# Patient Record
Sex: MALE | Race: WHITE | NOT HISPANIC OR LATINO | Employment: FULL TIME | URBAN - METROPOLITAN AREA
[De-identification: names, ages, dates, MRNs, and addresses within clinical notes are randomized per-mention and may not be internally consistent; named-entity substitution may affect disease eponyms.]

---

## 2023-09-01 ENCOUNTER — TRANSFERRED RECORDS (OUTPATIENT)
Dept: HEALTH INFORMATION MANAGEMENT | Facility: CLINIC | Age: 58
End: 2023-09-01

## 2023-12-08 ENCOUNTER — TRANSFERRED RECORDS (OUTPATIENT)
Dept: HEALTH INFORMATION MANAGEMENT | Facility: CLINIC | Age: 58
End: 2023-12-08

## 2024-01-12 ENCOUNTER — MEDICAL CORRESPONDENCE (OUTPATIENT)
Dept: HEALTH INFORMATION MANAGEMENT | Facility: CLINIC | Age: 59
End: 2024-01-12
Payer: COMMERCIAL

## 2024-01-15 ENCOUNTER — TRANSCRIBE ORDERS (OUTPATIENT)
Dept: OTHER | Age: 59
End: 2024-01-15

## 2024-01-15 DIAGNOSIS — M48.02 SPINAL STENOSIS IN CERVICAL REGION: Primary | ICD-10-CM

## 2024-01-16 ENCOUNTER — TELEPHONE (OUTPATIENT)
Dept: ORTHOPEDICS | Facility: CLINIC | Age: 59
End: 2024-01-16
Payer: COMMERCIAL

## 2024-01-16 NOTE — TELEPHONE ENCOUNTER
Sriram calling in today to see if a spine doc from the  will review his spine referral from Dr. Zaragoza at [facility] and recommend a provider for him. Per pt, he has a pretty complicated case and wants to be sure he's making his consult with an appropriate provider who can work on his spine. Informed pt I would submit a TE and have one of our care team members call him back with their recommendation. You can call pt back at cell number listed or call his wife-Gabbi at [phone]

## 2024-01-16 NOTE — TELEPHONE ENCOUNTER
Left Voice mail that Pt should call back and schedule with any of the cervical spine Surgeons. Needs an MRI within the last 6 months per protocol     Mariaa

## 2024-01-24 DIAGNOSIS — M54.2 CERVICAL SPINE PAIN: Primary | ICD-10-CM

## 2024-01-24 DIAGNOSIS — M48.02 CERVICAL STENOSIS OF SPINAL CANAL: Primary | ICD-10-CM

## 2024-01-29 ENCOUNTER — TELEPHONE (OUTPATIENT)
Dept: ORTHOPEDICS | Facility: CLINIC | Age: 59
End: 2024-01-29

## 2024-01-29 NOTE — TELEPHONE ENCOUNTER
[facility] Call Center    Phone Message    May a detailed message be left on voicemail: yes     Reason for Call: Other: Patient is calling to confirm that Dr. Atkinson and is team have received all the records from [facility], MRI's, xrays, and Dr. Villa notes.  Please call patient to confirm.      Action Taken: Message routed to:  [facility] ([facility] Ortho    Travel Screening: Not Applicable

## 2024-01-31 NOTE — TELEPHONE ENCOUNTER
Action January 31, 2024 9:55 AM MT   Action Taken Sent a request for records and imaging from [facility].      Action January 31, 2024 3:18 PM MT   Action Taken Sent a request for radiology reports from  Radiology and [facility].     DIAGNOSIS: 2nd Opinion  Spinal stenosis in cervical region [M48.02]  - Primary   APPOINTMENT DATE: 02/01/2024   NOTES STATUS DETAILS   OFFICE NOTE from referring provider Media Tab Dr. Humza Zaragoza  - [facility]   MEDICATION LIST Care Everywhere    LABS     MRI PACS  [facility]:  12/08/2023 - C Spine    [facility]:  08/30/2023 - Shoulder   XRAYS (IMAGES & REPORTS) PACS [facility]:  01/12/2024 - C Spine  10/13/2023, 08/06/2023 - RT Shoulder

## 2024-02-01 ENCOUNTER — OFFICE VISIT (OUTPATIENT)
Dept: ORTHOPEDICS | Facility: CLINIC | Age: 59
End: 2024-02-01
Payer: COMMERCIAL

## 2024-02-01 ENCOUNTER — PRE VISIT (OUTPATIENT)
Dept: ORTHOPEDICS | Facility: CLINIC | Age: 59
End: 2024-02-01

## 2024-02-01 VITALS — WEIGHT: 180 LBS | HEIGHT: 70 IN | BODY MASS INDEX: 25.77 KG/M2

## 2024-02-01 DIAGNOSIS — M48.02 SPINAL STENOSIS IN CERVICAL REGION: Primary | ICD-10-CM

## 2024-02-01 DIAGNOSIS — Q76.49 CONGENITAL CERVICAL SPINE STENOSIS: ICD-10-CM

## 2024-02-01 PROCEDURE — 99204 OFFICE O/P NEW MOD 45 MIN: CPT | Performed by: ORTHOPAEDIC SURGERY

## 2024-02-01 RX ORDER — ROSUVASTATIN CALCIUM 10 MG/1
10 TABLET, COATED ORAL DAILY
COMMUNITY
Start: 2023-11-14

## 2024-02-01 RX ORDER — ASPIRIN 81 MG/1
81 TABLET ORAL DAILY
COMMUNITY
Start: 2023-11-14

## 2024-02-01 NOTE — LETTER
2/1/2024         RE: Marvin Maza  [address] [place] [address]        Dear Colleague,    Thank you for referring your patient, Marvin Maza, to the [facility] [place]. Please see a copy of my visit note below.    In-Person Visit    REASON FOR CONSULTATION: RECHECK (NEW CERVICAL SPINE )     REFERRING PHYSICIAN: No ref. provider found   PCP:No Ref-Primary, Physician    Presents for 2nd opinion; previously saw Dr. Humza NEVAREZ    History of Present Illness: Patient presents to clinic today for 2nd opinion re cervical spine findings on MRI; accompanied by wife.      Patient suffered from a minor right shoulder fracture in August 2023.  While he was recovering from this, he started having increased pain and symptoms into the left arm radiating down into the biceps/triceps, all the way down into the forearm.  Due to these new symptoms, he had a cervical MRI done which showed severe spinal canal stenosis, which led to referral to see a spine surgeon.    He saw Dr. Humza Zaragoza ([facility]) who recommended surgery for.  However, his left arm symptoms have since improved. He does deal with chronic numbness in digits 1 through 3 on the left side, but this is not extremely bothersome to him.  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  He denies any new or worsening balance difficulties.  Denies new or worsening weakness in the upper or lower extremities.  He is here today to review imaging and discuss treatment plans.    Neck 0%, Arm 0%,      Previous treament:   Engaged in physical therapy for his right shoulder fracture    Social history  Work: Patient is a dentist  Tobacco use: Denies  Recreation: Patient is very active at baseline.  He enjoys golfing.  He utilizes treadmill and stationary bike for cardiac exercises.  Previously was doing a lot of lifting.      Oswestry (LEYLA) Questionnaire         "1/25/2024     2:07 PM   OSWESTRY DISABILITY INDEX   Count 10   Sum 1   Oswestry Score (%) 2 %        Neck Disability Index (NDI) Questionnaire        1/25/2024     2:20 PM   Neck Disability Index (NDI)   Neck Disability Index: Count 10   NDI: Total Score = SUM (points for all 10 findings) 1   Neck Disability in Percent = (Total Score) / 50 * 100 2 (%)        PROMIS-10 Scores  Global Mental Health Score: (P) 19  Global Physical Health Score: (P) 18  PROMIS TOTAL - SUBSCORES: (P) 37    ROS: A 12-point review of systems was completed and is negative except for otherwise noted above in the history of present illness.    Med Hx:  Past Medical History:   Diagnosis Date    NO ACTIVE PROBLEMS        Surg Hx:  Past Surgical History:   Procedure Laterality Date    DENTAL SURGERY PROCEDURE      HERNIA REPAIR, INGUINAL RT/LT      LT, 10 weeks old.     INCISION OF PYLORIC MUSCLE      12 weeks old.        Allergies:  No Known Allergies    Meds:  Current Outpatient Medications   Medication    aspirin 81 MG EC tablet    rosuvastatin (CRESTOR) 10 MG tablet    MEDROL (REAGAN) 4 MG OR TABS    MULTI-VITAMIN OR TABS    SYNALAR 0.01 % EX SOLN     No current facility-administered medications for this visit.       Fam Hx:  Family History   Problem Relation Age of Onset    C.A.D. Father         69 yo       P/S Hx:  Social History     Tobacco Use    Smoking status: Never    Smokeless tobacco: Not on file   Substance Use Topics    Alcohol use: Yes     Comment: occasional         Physical Exam:  Very pleasant, healthy appearing, alert, oriented x 3, cooperative.  Normal mood and affect.  Not in cardiorespiratory distress.  Ht 1.784 m (5' 10.25\")   Wt 81.6 kg (180 lb)   BMI 25.64 kg/m    Normal upright posture.    Normal gait without assistive device.  No antalgia / imbalance.  Able to perform tandem gait with ease.  Negative Romberg's.    Neck: normal neck posture, able to maintain horizontal gaze.  No deformity, no skin lesions or surgical " scars.  No neck tenderness.  ROM:   Full painless flexion, extension, R and L rotation.  (-) Spurling's bilat.  (-) Lhermitte's.     Neuro Exam:  Motor: 5/5 strength for all muscle groups in both UE; except 4+/5 left wrist extension  Sensory:  Intact to light touch in both UE's.   Reflexes:      Biceps 2+ bilat.      Brachioradialis 2+ bilat.      Triceps 2+ bilat.    (+) Smith right, (-) left.    Upper extremity:  Full pulses, pink nailbeds, good capillary / refill.  (-) atrophy / asymmetry.        Imagin2024 XR cervical spine AP/lateral flexion/extension views: Multilevel spondylosis.  No evidence of fracture or spondylolisthesis.  No evidence of instability on flexion/extension views.    2023 MRI cervical spine without contrast: Congenitally narrow spinal canal measuring about 8 mm anterior-posterior distance.  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    Plan:   Reviewed patient's own MR and XR imaging with him today.  He does have age-appropriate degenerative changes to his neck.  He appears to have congenital narrowing of his spinal canal.  Therefore, there are multiple areas of spinal canal stenosis noted on MRI.  Fortunately, patient is currently asymptomatic, no evidence of myelopathy, and his previous cervical radicular symptoms have resolved.  Discussed risks and benefits of proceeding with surgery versus continued with observational management.  At this point, we would recommend continued observation.  Discussed the symptoms of cervical myelopathy in detail, and patient can reach out to us if these begin to develop.  At that time, we can discuss surgical intervention in further detail.  If myelopathic symptoms do develop, then likely would recommend laminoplasty procedure.  Patient and his wife asked many  intelligent questions which were answered to their understanding.    - Okay to continue with activities as tolerated including biking, running, lifting, golfing.  Avoid high impact activities.     RTC in 1 year for recheck, with repeat XR cervical AP/lateral views. Or sooner if he develops myelopathic/ radicular symptoms    Attestation:  I (Dr. Joe Atkinson - Spine Surgeon) have personally evaluated patient with PGY-1 Legister, and agree with findings and plan outlined in the note, which I also edited.  I discussed at length with the patient/family, explained the nature of spinal condition, and formulated workup and/or treatment plan together.  All questions were answered to the best of my ability and to patient's apparent satisfaction.     45 minutes spent on the date of the encounter doing chart review/review of outside records/review of test results/interpretation of tests/patient visit/documentation/discussion with other provider(s)/discussion with patient and family.    Joe Atkinson MD    Orthopaedic Spine Surgery  Dept Orthopaedic Surgery, [facility]  [phone] office, [phone] pager  [email or web address]

## 2024-02-01 NOTE — PROGRESS NOTES
In-Person Visit    REASON FOR CONSULTATION: RECHECK (NEW CERVICAL SPINE )     REFERRING PHYSICIAN: No ref. provider found   PCP:No Ref-Primary, Physician    Presents for 2nd opinion; previously saw Dr. Humza NEVAREZ    History of Present Illness: Patient presents to clinic today for 2nd opinion re cervical spine findings on MRI; accompanied by wife.      Patient suffered from a minor right shoulder fracture in August 2023.  While he was recovering from this, he started having increased pain and symptoms into the left arm radiating down into the biceps/triceps, all the way down into the forearm.  Due to these new symptoms, he had a cervical MRI done which showed severe spinal canal stenosis, which led to referral to see a spine surgeon.    He saw Dr. Humza Zaragoza ([facility]) who recommended surgery for.  However, his left arm symptoms have since improved. He does deal with chronic numbness in digits 1 through 3 on the left side, but this is not extremely bothersome to him.  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  He denies any new or worsening balance difficulties.  Denies new or worsening weakness in the upper or lower extremities.  He is here today to review imaging and discuss treatment plans.    Neck 0%, Arm 0%,      Previous treament:   Engaged in physical therapy for his right shoulder fracture    Social history  Work: Patient is a dentist  Tobacco use: Denies  Recreation: Patient is very active at baseline.  He enjoys golfing.  He utilizes treadmill and stationary bike for cardiac exercises.  Previously was doing a lot of lifting.      Oswestry (LEYLA) Questionnaire        1/25/2024     2:07 PM   OSWESTRY DISABILITY INDEX   Count 10   Sum 1   Oswestry Score (%) 2 %        Neck Disability Index (NDI) Questionnaire        1/25/2024     2:20 PM   Neck Disability Index (NDI)   Neck Disability Index: Count 10   NDI: Total Score = SUM (points  "for all 10 findings) 1   Neck Disability in Percent = (Total Score) / 50 * 100 2 (%)        PROMIS-10 Scores  Global Mental Health Score: (P) 19  Global Physical Health Score: (P) 18  PROMIS TOTAL - SUBSCORES: (P) 37    ROS: A 12-point review of systems was completed and is negative except for otherwise noted above in the history of present illness.    Med Hx:  Past Medical History:   Diagnosis Date    NO ACTIVE PROBLEMS        Surg Hx:  Past Surgical History:   Procedure Laterality Date    DENTAL SURGERY PROCEDURE      HERNIA REPAIR, INGUINAL RT/LT      LT, 10 weeks old.     INCISION OF PYLORIC MUSCLE      12 weeks old.        Allergies:  No Known Allergies    Meds:  Current Outpatient Medications   Medication    aspirin 81 MG EC tablet    rosuvastatin (CRESTOR) 10 MG tablet    MEDROL (REAGAN) 4 MG OR TABS    MULTI-VITAMIN OR TABS    SYNALAR 0.01 % EX SOLN     No current facility-administered medications for this visit.       Fam Hx:  Family History   Problem Relation Age of Onset    C.A.D. Father         69 yo       P/S Hx:  Social History     Tobacco Use    Smoking status: Never    Smokeless tobacco: Not on file   Substance Use Topics    Alcohol use: Yes     Comment: occasional         Physical Exam:  Very pleasant, healthy appearing, alert, oriented x 3, cooperative.  Normal mood and affect.  Not in cardiorespiratory distress.  Ht 1.784 m (5' 10.25\")   Wt 81.6 kg (180 lb)   BMI 25.64 kg/m    Normal upright posture.    Normal gait without assistive device.  No antalgia / imbalance.  Able to perform tandem gait with ease.  Negative Romberg's.    Neck: normal neck posture, able to maintain horizontal gaze.  No deformity, no skin lesions or surgical scars.  No neck tenderness.  ROM:   Full painless flexion, extension, R and L rotation.  (-) Spurling's bilat.  (-) Lhermitte's.     Neuro Exam:  Motor: 5/5 strength for all muscle groups in both UE; except 4+/5 left wrist extension  Sensory:  Intact to light touch in " both UE's.   Reflexes:      Biceps 2+ bilat.      Brachioradialis 2+ bilat.      Triceps 2+ bilat.    (+) Smith right, (-) left.    Upper extremity:  Full pulses, pink nailbeds, good capillary / refill.  (-) atrophy / asymmetry.        Imagin2024 XR cervical spine AP/lateral flexion/extension views: Multilevel spondylosis.  No evidence of fracture or spondylolisthesis.  No evidence of instability on flexion/extension views.    2023 MRI cervical spine without contrast: Congenitally narrow spinal canal measuring about 8 mm anterior-posterior distance.  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    Plan:   Reviewed patient's own MR and XR imaging with him today.  He does have age-appropriate degenerative changes to his neck.  He appears to have congenital narrowing of his spinal canal.  Therefore, there are multiple areas of spinal canal stenosis noted on MRI.  Fortunately, patient is currently asymptomatic, no evidence of myelopathy, and his previous cervical radicular symptoms have resolved.  Discussed risks and benefits of proceeding with surgery versus continued with observational management.  At this point, we would recommend continued observation.  Discussed the symptoms of cervical myelopathy in detail, and patient can reach out to us if these begin to develop.  At that time, we can discuss surgical intervention in further detail.  If myelopathic symptoms do develop, then likely would recommend laminoplasty procedure.  Patient and his wife asked many intelligent questions which were answered to their understanding.    - Okay to continue with activities as tolerated including biking, running, lifting, golfing.  Avoid high impact activities.     RTC in 1 year for recheck, with repeat XR cervical AP/lateral views. Or sooner  if he develops myelopathic/ radicular symptoms    Attestation:  I (Dr. Joe Atkinson - Spine Surgeon) have personally evaluated patient with PGY-1 Legister, and agree with findings and plan outlined in the note, which I also edited.  I discussed at length with the patient/family, explained the nature of spinal condition, and formulated workup and/or treatment plan together.  All questions were answered to the best of my ability and to patient's apparent satisfaction.     45 minutes spent on the date of the encounter doing chart review/review of outside records/review of test results/interpretation of tests/patient visit/documentation/discussion with other provider(s)/discussion with patient and family.    Joe Atkinson MD    Orthopaedic Spine Surgery  Dept Orthopaedic Surgery, [facility]  [phone] office, [phone] pager  [email or web address]

## 2024-03-23 ENCOUNTER — HEALTH MAINTENANCE LETTER (OUTPATIENT)
Age: 59
End: 2024-03-23

## 2025-01-22 DIAGNOSIS — M48.02 SPINAL STENOSIS IN CERVICAL REGION: Primary | ICD-10-CM

## 2025-01-22 DIAGNOSIS — Q76.49 CONGENITAL CERVICAL SPINE STENOSIS: ICD-10-CM

## 2025-04-12 ENCOUNTER — HEALTH MAINTENANCE LETTER (OUTPATIENT)
Age: 60
End: 2025-04-12